# Patient Record
Sex: FEMALE | Race: WHITE | NOT HISPANIC OR LATINO | Employment: PART TIME | URBAN - METROPOLITAN AREA
[De-identification: names, ages, dates, MRNs, and addresses within clinical notes are randomized per-mention and may not be internally consistent; named-entity substitution may affect disease eponyms.]

---

## 2021-07-27 ENCOUNTER — HOSPITAL ENCOUNTER (OUTPATIENT)
Dept: PSYCHIATRY | Facility: HOSPITAL | Age: 21
Discharge: HOME | End: 2021-07-27
Admitting: COUNSELOR

## 2021-07-27 PROCEDURE — 90791 PSYCH DIAGNOSTIC EVALUATION: CPT | Performed by: SOCIAL WORKER
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
## 2021-07-28 ENCOUNTER — OFFICE VISIT (OUTPATIENT)
Dept: PSYCHIATRY | Facility: HOSPITAL | Age: 21
End: 2021-07-28

## 2021-07-28 DIAGNOSIS — F33.1 MAJOR DEPRESSIVE DISORDER, RECURRENT EPISODE, MODERATE (HCC): Primary | ICD-10-CM

## 2021-07-28 PROCEDURE — S9480 INTENSIVE OUTPATIENT PSYCHIA: HCPCS | Performed by: COUNSELOR

## 2021-07-28 NOTE — PROGRESS NOTES
Mood at 8 with 10 being the worse and found having the structure of the program to be most helpful today    Feeling sad or empty   Fatigue or loss of energy  Easily tearing up/crying  Increased Irritabliliy  Easily distracted  An increase/decrease in normal appetite  Changes in normal sleep patterns (increase, decrease, or broken hoours of sleep)  Feelings of worthlessness  Feelings of increased guilt  Increased nervous feelings/anxiety  Racing thoughts     No SI    To return to IOP tomorrow

## 2021-07-28 NOTE — PROGRESS NOTES
Other     Information/activity     [id number] Art Therapy - Change of Perspective  - Use of markers on paper to practice different perspectives on chosen topic     Instructor Geraldo Justin, [facility]     10:29 EDT     Patient Response Good participation     Pt focused on the task and shared of feeling family is a toxic environment and looking forward to moving out. Accepted peer feedback

## 2021-07-28 NOTE — PROGRESS NOTES
Psych IOP  Group note  Observations:    Engaged in Activity / Process and Self -disclosed: Yes  Applies Topic to self: Yes  Able to give Constructive Feedback: No  Affect: sad and anxious  Degree of Insightful Thinking 4  Notes:  Pt was new to the group today.  Shared of depression d/t recent break-up and alluded to issues with resurfacing past trauma.  Pt received support from the group.  She was attentive and remained quiet as others shared.      Liliana Cannon, [facility]

## 2021-07-29 ENCOUNTER — OFFICE VISIT (OUTPATIENT)
Dept: PSYCHIATRY | Facility: HOSPITAL | Age: 21
End: 2021-07-29

## 2021-07-29 DIAGNOSIS — F33.1 MAJOR DEPRESSIVE DISORDER, RECURRENT EPISODE, MODERATE (HCC): Primary | ICD-10-CM

## 2021-07-29 PROCEDURE — S9480 INTENSIVE OUTPATIENT PSYCHIA: HCPCS | Performed by: SOCIAL WORKER

## 2021-07-29 NOTE — PROGRESS NOTES
"Other     Information/activity     [id number]     Discussed the purpose of emotions, read handout Emotional Health, discussed handout Feelings Wheel and practiced \"I feel___\" statements    Instructor [facility]eraldo [facility]     10:32 EDT     Patient Response Good participation  Pt shared in discussion and gave personal examples        "

## 2021-07-29 NOTE — PROGRESS NOTES
Psych IOP  Group note  Observations:    Engaged in Activity / Process and Self -disclosed: Yes  Applies Topic to self: Yes  Able to give Constructive Feedback: Yes  Affect: engaged   Degree of Insightful Thinking 6  Notes:  Pt mostly listened as others shared today but related well to peers and noted personal experience with topics. Pt shared of similar work environment as another peer and offered supportive feedback about self-care during the work day. Pt also related to peer who shared of feeling not heard or misunderstood by friends and providers as it relates to her depression.       CHRISTINE SheppardW

## 2021-07-29 NOTE — PROGRESS NOTES
Mood at 4 with 10 being the worse and shared the class about emotions was the most helpful today.    Symptoms include:    Changes in normal sleep patterns (increase, decrease, or broken hoours of sleep)  Feelings of increased guilt     Denied SI and shared appropriate goals. Pt received prescription for med from [facility].
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
## 2021-07-30 ENCOUNTER — OFFICE VISIT (OUTPATIENT)
Dept: PSYCHIATRY | Facility: HOSPITAL | Age: 21
End: 2021-07-30

## 2021-07-30 DIAGNOSIS — F33.1 MAJOR DEPRESSIVE DISORDER, RECURRENT EPISODE, MODERATE (HCC): Primary | ICD-10-CM

## 2021-07-30 PROCEDURE — S9480 INTENSIVE OUTPATIENT PSYCHIA: HCPCS | Performed by: SOCIAL WORKER

## 2021-07-30 NOTE — PROGRESS NOTES
Mood at 8 with 10 being the worse and found MIKAL talk to be most helpful today.     Feeling sad or empty   Trouble with concentration, memory, or making decisions  Increased Irritabliliy  Easily distracted  An increase/decrease in normal appetite  Changes in normal sleep patterns (increase, decrease, or broken hoours of sleep)  Feelings of worthlessness  Feelings of increased guilt  Increased nervous feelings/anxiety  Racing thoughts     No SI and appropriate goals for rest of the day.

## 2021-07-30 NOTE — PROGRESS NOTES
Psych IOP  Group note  Observations:    Engaged in Activity / Process and Self -disclosed: Yes   Applies Topic to self: Yes  Able to give Constructive Feedback: Yes  Affect: quiet   Degree of Insightful Thinking 7  Notes:  Pt quiet but attentive in group today. Pt shared about self as it related to things others talked about and offered feedback and helpful suggestions. Pt reported reading before bed helps her to get sleepy and related personal experience in lack of support from mother to other peer who shared.       CHRISTINE SheppardW

## 2021-07-30 NOTE — PROGRESS NOTES
"[id number] Psych IOP Class     Class topic: anxiety handout with Yoli REN talk, \"The Nicole of Invulnerability\".      Class participation: good; pt shared personal experience with topic; attentive and engaged and reported benefit from video.   "

## 2021-08-02 ENCOUNTER — OFFICE VISIT (OUTPATIENT)
Dept: PSYCHIATRY | Facility: HOSPITAL | Age: 21
End: 2021-08-02

## 2021-08-02 DIAGNOSIS — F33.1 MAJOR DEPRESSIVE DISORDER, RECURRENT EPISODE, MODERATE (HCC): Primary | ICD-10-CM

## 2021-08-02 PROCEDURE — S9480 INTENSIVE OUTPATIENT PSYCHIA: HCPCS | Performed by: COUNSELOR

## 2021-08-02 NOTE — PROGRESS NOTES
Psych IOP  Group note  Observations:    Engaged in Activity / Process and Self -disclosed: Yes  Applies Topic to self: Yes  Able to give Constructive Feedback: No  Affect: anxious  Degree of Insightful Thinking 5  Notes:  Pt reported she continues with several depressive symptoms and unable to accomplish as much this weekend toward her med school essay as she had hoped. Discussed she is preparing to move into an apartment with a roommate later this week.  She remained quiet but attentive as others shared today.      Liliana Cannon, [facility]

## 2021-08-02 NOTE — PROGRESS NOTES
"Other     Information/activity     [id number] Art Therapy - [employer] Watercolor - mindful, non-judgemental focus on use of watercolors for 30 minutes and then processing    Instructor Geraldo Justin, [facility]     10:28 EDT     Patient Response Good participation  Pt focused on the task and shared of feeling of guilt for not being able to control relationships or \"go with the flow\"        "

## 2021-08-02 NOTE — PROGRESS NOTES
Mood at 4 with 10 being the worse and shared art therapy was helpful today.    Symptoms include:    Trouble with concentration, memory, or making decisions  Fatigue or loss of energy  Easily distracted  An increase/decrease in normal appetite  Changes in normal sleep patterns (increase, decrease, or broken hoours of sleep)  Feelings of worthlessness  Feelings of increased guilt  Increased nervous feelings/anxiety  Racing thoughts     Denied SI and shared appropriate goals.

## 2021-08-03 ENCOUNTER — OFFICE VISIT (OUTPATIENT)
Dept: PSYCHIATRY | Facility: HOSPITAL | Age: 21
End: 2021-08-03

## 2021-08-03 DIAGNOSIS — F33.1 MAJOR DEPRESSIVE DISORDER, RECURRENT EPISODE, MODERATE (HCC): Primary | ICD-10-CM

## 2021-08-03 PROCEDURE — S9480 INTENSIVE OUTPATIENT PSYCHIA: HCPCS | Performed by: COUNSELOR

## 2021-08-03 NOTE — PROGRESS NOTES
"[id number] Psych [facility] Class     Class topic: cognitive distortions handout with MIKAL talk on \"How to Not Take Things Personally\"    Class participation: good; pt listened quietly; appeared attentive but did not share.   "

## 2021-08-03 NOTE — PROGRESS NOTES
"The patient reports some \"muscle twitching\" with initiation of Lexapro but otherwise is tolerating the medication without complaint.  She offers no other complaints when seen today.  I have explained to the patient the length of time which we will want her to take a medication to address her current depressive episode as well as follow-up plans.  "

## 2021-08-03 NOTE — PROGRESS NOTES
Mood at 6 with 10 being the worse and found getting questions answered from Dr. Hinojosa to be most helpful today.     Feeling sad or empty   Trouble with concentration, memory, or making decisions  Fatigue or loss of energy  Increased Irritabliliy  Easily distracted  An increase/decrease in normal appetite  Changes in normal sleep patterns (increase, decrease, or broken hoours of sleep)  Feelings of worthlessness  Feelings of increased guilt  Increased nervous feelings/anxiety  Racing thoughts     No SI and appropriate goals for rest of the day. Pt provided with therapist referrals in preparation for discharge.

## 2021-08-03 NOTE — PROGRESS NOTES
Psych IOP  Group note  Observations:    Engaged in Activity / Process and Self -disclosed: Yes  Applies Topic to self: Yes  Able to give Constructive Feedback: No  Affect: flat  Degree of Insightful Thinking 5  Notes:  Pt remained quiet until called upon.  She discussed poor sleep and difficulty getting here this morning.  Has plans to go play volleyball with friends later today and is looking forward to it.  Remained quiet as peers shared today.      Liliana Cannon, [facility]

## 2021-08-05 ENCOUNTER — DOCUMENTATION (OUTPATIENT)
Dept: PSYCHIATRY | Facility: HOSPITAL | Age: 21
End: 2021-08-05
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
## 2021-08-06 ENCOUNTER — APPOINTMENT (OUTPATIENT)
Dept: PSYCHIATRY | Facility: HOSPITAL | Age: 21
End: 2021-08-06

## 2021-08-09 ENCOUNTER — APPOINTMENT (OUTPATIENT)
Dept: PSYCHIATRY | Facility: HOSPITAL | Age: 21
End: 2021-08-09

## 2021-08-10 ENCOUNTER — APPOINTMENT (OUTPATIENT)
Dept: PSYCHIATRY | Facility: HOSPITAL | Age: 21
End: 2021-08-10

## 2021-08-11 ENCOUNTER — APPOINTMENT (OUTPATIENT)
Dept: PSYCHIATRY | Facility: HOSPITAL | Age: 21
End: 2021-08-11

## 2021-08-12 ENCOUNTER — APPOINTMENT (OUTPATIENT)
Dept: PSYCHIATRY | Facility: HOSPITAL | Age: 21
End: 2021-08-12

## 2021-08-13 ENCOUNTER — APPOINTMENT (OUTPATIENT)
Dept: PSYCHIATRY | Facility: HOSPITAL | Age: 21
End: 2021-08-13

## 2021-08-16 ENCOUNTER — APPOINTMENT (OUTPATIENT)
Dept: PSYCHIATRY | Facility: HOSPITAL | Age: 21
End: 2021-08-16

## 2021-08-17 ENCOUNTER — APPOINTMENT (OUTPATIENT)
Dept: PSYCHIATRY | Facility: HOSPITAL | Age: 21
End: 2021-08-17

## 2021-08-18 ENCOUNTER — APPOINTMENT (OUTPATIENT)
Dept: PSYCHIATRY | Facility: HOSPITAL | Age: 21
End: 2021-08-18

## 2021-08-19 ENCOUNTER — APPOINTMENT (OUTPATIENT)
Dept: PSYCHIATRY | Facility: HOSPITAL | Age: 21
End: 2021-08-19

## 2021-08-20 ENCOUNTER — APPOINTMENT (OUTPATIENT)
Dept: PSYCHIATRY | Facility: HOSPITAL | Age: 21
End: 2021-08-20

## 2021-08-23 ENCOUNTER — APPOINTMENT (OUTPATIENT)
Dept: PSYCHIATRY | Facility: HOSPITAL | Age: 21
End: 2021-08-23

## 2021-08-24 ENCOUNTER — APPOINTMENT (OUTPATIENT)
Dept: PSYCHIATRY | Facility: HOSPITAL | Age: 21
End: 2021-08-24